# Patient Record
Sex: MALE | Race: WHITE | Employment: FULL TIME | URBAN - METROPOLITAN AREA
[De-identification: names, ages, dates, MRNs, and addresses within clinical notes are randomized per-mention and may not be internally consistent; named-entity substitution may affect disease eponyms.]

---

## 2022-02-21 ENCOUNTER — APPOINTMENT (OUTPATIENT)
Dept: ULTRASOUND IMAGING | Age: 32
End: 2022-02-21
Payer: COMMERCIAL

## 2022-02-21 ENCOUNTER — HOSPITAL ENCOUNTER (EMERGENCY)
Age: 32
Discharge: HOME | End: 2022-02-21
Attending: EMERGENCY MEDICINE
Payer: COMMERCIAL

## 2022-02-21 VITALS
OXYGEN SATURATION: 97 % | HEIGHT: 71 IN | RESPIRATION RATE: 16 BRPM | TEMPERATURE: 98.1 F | BODY MASS INDEX: 30.1 KG/M2 | HEART RATE: 87 BPM | SYSTOLIC BLOOD PRESSURE: 160 MMHG | DIASTOLIC BLOOD PRESSURE: 82 MMHG | WEIGHT: 215 LBS

## 2022-02-21 DIAGNOSIS — I86.1 LEFT VARICOCELE: Primary | ICD-10-CM

## 2022-02-21 LAB
-: ABNORMAL
AMORPHOUS: ABNORMAL
BACTERIA: ABNORMAL
BILIRUBIN URINE: NEGATIVE
COLOR: YELLOW
EPITHELIAL CELLS UA: ABNORMAL /HPF (ref 0–5)
GLUCOSE URINE: NEGATIVE
KETONES, URINE: NEGATIVE
LEUKOCYTE ESTERASE, URINE: NEGATIVE
NITRITE, URINE: NEGATIVE
OTHER OBSERVATIONS UA: ABNORMAL
PH UA: 6 (ref 5–8)
PROTEIN UA: NEGATIVE
RBC UA: ABNORMAL /HPF (ref 0–2)
SPECIFIC GRAVITY UA: 1.01 (ref 1–1.03)
TURBIDITY: ABNORMAL
URINE HGB: NEGATIVE
UROBILINOGEN, URINE: NORMAL
WBC UA: ABNORMAL /HPF (ref 0–5)

## 2022-02-21 PROCEDURE — 99282 EMERGENCY DEPT VISIT SF MDM: CPT

## 2022-02-21 PROCEDURE — 87591 N.GONORRHOEAE DNA AMP PROB: CPT

## 2022-02-21 PROCEDURE — 87491 CHLMYD TRACH DNA AMP PROBE: CPT

## 2022-02-21 PROCEDURE — 81001 URINALYSIS AUTO W/SCOPE: CPT

## 2022-02-21 PROCEDURE — 76870 US EXAM SCROTUM: CPT

## 2022-02-21 ASSESSMENT — PAIN SCALES - GENERAL: PAINLEVEL_OUTOF10: 5

## 2022-02-21 ASSESSMENT — PAIN - FUNCTIONAL ASSESSMENT: PAIN_FUNCTIONAL_ASSESSMENT: 0-10

## 2022-02-21 NOTE — ED PROVIDER NOTES
[address]      Pt Name: Ciara Stratton  MRN: [id number]  [place] 1990  Date of evaluation: 2/21/2022      CHIEF COMPLAINT       Chief Complaint   Patient presents with    Scrotal Pain     since previous monday 2/14         HISTORY OF PRESENT ILLNESS      The patient presents with scrotal pain for the past week. He has history of a varicocele when he was in high school but no recent issues. He says he has had foamy sperm but no dysuria or hematuria. He says he has intermittent discomfort in the testicles. At its worst it is an 8 out of 10. Right now it is a 5 out of 10. He is not taking anything for it. He denies any new partners. He denies any penile lesions. He denies fever. REVIEW OF SYSTEMS       All systems reviewed and negative unless noted in HPI. The patient denies fever or constitutional symptoms. Denies vision change. Denies any sore throat or rhinorrhea. Denies any neck pain or stiffness. Denies chest pain or shortness of breath. No nausea,  vomiting or diarrhea. Scrotal pain for 1 week. Denies true dysuria. Denies hematuria. Denies musculoskeletal injury or pain. Denies any weakness, numbness or focal neurologic deficit. Denies any skin rash or edema. No recent psychiatric issues. No easy bruising or bleeding. Denies any polyuria, polydypsia or history of immunocompromise. PAST MEDICAL HISTORY    has no past medical history on file. SURGICAL HISTORY      has no past surgical history on file. CURRENT MEDICATIONS       There are no discharge medications for this patient. ALLERGIES     is allergic to ceclor [cefaclor]. FAMILY HISTORY     has no family status information on file. family history is not on file. SOCIAL HISTORY      reports that he has never smoked. He has never used smokeless tobacco. He reports current alcohol use.     PHYSICAL EXAM     INITIAL VITALS:  height NEGATIVE    pH, UA 6.0 5.0 - 8.0    Protein, UA NEGATIVE NEGATIVE    Urobilinogen, Urine Normal Normal    Nitrite, Urine NEGATIVE NEGATIVE    Leukocyte Esterase, Urine NEGATIVE NEGATIVE   Microscopic Urinalysis   Result Value Ref Range    -          WBC, UA 0 TO 2 0 - 5 /HPF    RBC, UA 0 TO 2 0 - 2 /HPF    Epithelial Cells UA 0 TO 2 0 - 5 /HPF    Bacteria, UA FEW (A) None    Amorphous, UA 2+ (A) None    Other Observations UA (A) NOT REQ. Utilizing a urinalysis as the only screening method to exclude a potential uropathogen can be unreliable in many patient populations. Rapid screening tests are less sensitive than culture and if UTI is a clinical possibility, culture should be considered despite a negative urinalysis. EMERGENCY DEPARTMENT COURSE:   Vitals:    Vitals:    02/21/22 1820   BP: (!) 160/82   Pulse: 87   Resp: 16   Temp: 98.1 °F (36.7 °C)   TempSrc: Oral   SpO2: 97%   Weight: 215 lb (97.5 kg)   Height: 5' 11\" (1.803 m)     -------------------------  BP: (!) 160/82, Temp: 98.1 °F (36.7 °C), Pulse: 87, Resp: 16      Re-evaluation Notes    The patient will be endorsed to Dr. Taylor Rodriguez secondary to end of shift. Please refer to her documentation. FINAL IMPRESSION      1. Left varicocele          DISPOSITION/PLAN   DISPOSITION Decision To Discharge 02/21/2022 08:24:09 PM      Condition on Disposition    stable    PATIENT REFERRED TO:  Isael Mccormick MD  [address] [place] [address], [address] [place] [address]  [phone]    Call in 1 day        DISCHARGE MEDICATIONS:  There are no discharge medications for this patient.       (Please note that portions of this note were completed with a voice recognition program.  Efforts were made to edit the dictations but occasionally words are mis-transcribed.)    Yuan Beckett MD,, MD   Attending Emergency Physician         Magaly David MD  02/25/22 [id number]

## 2022-02-21 NOTE — ED NOTES
Patient arrived to ER with complaints of intermittent testicular pain with no n/v or pain anywhere else. Patient states had begun suddenly with no injury to affected area. Patient denies any pain or difficulty with urination.       Adrián Otero RN  02/21/22 [id number]

## 2022-02-21 NOTE — ED NOTES
Paged Karen in for a rule out testicular torsion @ 1816 , she said she will be here w/in the hour     Mikki South  02/21/22 1817

## 2022-02-22 NOTE — ED PROVIDER NOTES
FACULTY SIGN-OUT  ADDENDUM     Care of this patient was assumed from Dr. Rohit Farias. The patient was seen for Scrotal Pain (since previous monday 2/14)  . The patient's initial evaluation and plan have been discussed with the prior provider who initially evaluated the patient. Nursing Notes, Past Medical Hx, Past Surgical Hx, Social Hx, Allergies, and Family Hx were all reviewed. ED COURSE      The patient was given the following medications:  No orders of the defined types were placed in this encounter. Labs Reviewed   URINALYSIS WITH REFLEX TO CULTURE - Abnormal; Notable for the following components:       Result Value    Turbidity UA SLIGHTLY CLOUDY (*)     All other components within normal limits   MICROSCOPIC URINALYSIS - Abnormal; Notable for the following components:    Bacteria, UA FEW (*)     Amorphous, UA 2+ (*)     Other Observations UA   (*)     Value: Utilizing a urinalysis as the only screening method to exclude a potential uropathogen can be unreliable in many patient populations. Rapid screening tests are less sensitive than culture and if UTI is a clinical possibility, culture should be considered despite a negative urinalysis. All other components within normal limits   C.TRACHOMATIS N.GONORRHOEAE DNA, URINE       US SCROTUM W LIMITED DUPLEX    Result Date: 2/21/2022  EXAMINATION: ULTRASOUND OF THE SCROTUM/TESTICLES WITH COLOR DOPPLER FLOW EVALUATION 2/21/2022 TECHNIQUE: Duplex ultrasound using B-mode/gray scaled imaging, Doppler spectral analysis and color flow Doppler was obtained of the testicles. COMPARISON: 06/18/2005 HISTORY: ORDERING SYSTEM PROVIDED HISTORY: pain TECHNOLOGIST PROVIDED HISTORY: pain FINDINGS: Measurements: Right Testicle: 4.1 x 1.8 x 2.6 cm Left Testicle: 4.0 x 2.0 x 2.8 cm Right: Grey Scale: The right testicle demonstrates normal homogeneous echotexture without focal lesion. No evidence of testicular microlithiasis.  Doppler Evaluation: There is normal arterial and venous Doppler flow within the testicle. Scrotal Sac: No evidence of hydrocele. Epididymis: No acute abnormality. Left: Grey Scale: The left testicle demonstrates normal homogeneous echotexture without focal lesion. No evidence of testicular microlithiasis. Doppler Evaluation: There is normal arterial and venous Doppler flow within the testicle. Scrotal Sac: No evidence of hydrocele. There is a left varicocele Epididymis: No acute abnormality. Left varicocele       RECENT VITALS:   Temp: 98.1 °F (36.7 °C), Pulse: 87, Resp: 16, BP: (!) 160/82    MEDICAL DECISION MAKING       I did go over results with the patient. He relates that he will happily follow-up with urology and I have given him the name and number for this purpose.     Impression: Left varicocele    Disposition: Corrinne Galt, MD  Emergency Medicine Attending      Dakota Santiago MD  02/21/22 2030

## 2022-02-23 LAB
C. TRACHOMATIS DNA ,URINE: NEGATIVE
N. GONORRHOEAE DNA, URINE: NEGATIVE
SPECIMEN DESCRIPTION: NORMAL